# Patient Record
Sex: FEMALE | Race: WHITE | URBAN - METROPOLITAN AREA
[De-identification: names, ages, dates, MRNs, and addresses within clinical notes are randomized per-mention and may not be internally consistent; named-entity substitution may affect disease eponyms.]

---

## 2019-06-26 ENCOUNTER — TELEPHONE (OUTPATIENT)
Dept: PRIMARY CARE CLINIC | Age: 49
End: 2019-06-26

## 2019-06-26 NOTE — TELEPHONE ENCOUNTER
I am supposed to be on a hold for new patients for 6 mos to catch up with everything happening since went on epic. They ask me every day.  Better say no for now unless they don't mind waiting

## 2019-06-27 NOTE — TELEPHONE ENCOUNTER
ANA MARÍA ZARATE, PLEASE REVIEW DR. Stevenson Backer NOTE. HE'D LIKE TO HOLD OFF ON NEW PTS FOR RIGHT NOW. THANKS!